# Patient Record
Sex: MALE | Race: OTHER | NOT HISPANIC OR LATINO | Employment: UNEMPLOYED | URBAN - METROPOLITAN AREA
[De-identification: names, ages, dates, MRNs, and addresses within clinical notes are randomized per-mention and may not be internally consistent; named-entity substitution may affect disease eponyms.]

---

## 2017-08-09 ENCOUNTER — HOSPITAL ENCOUNTER (EMERGENCY)
Facility: HOSPITAL | Age: 47
Discharge: HOME | End: 2017-08-09
Attending: EMERGENCY MEDICINE | Admitting: EMERGENCY MEDICINE

## 2017-08-09 ENCOUNTER — APPOINTMENT (EMERGENCY)
Dept: RADIOLOGY | Facility: HOSPITAL | Age: 47
End: 2017-08-09

## 2017-08-09 VITALS
DIASTOLIC BLOOD PRESSURE: 60 MMHG | WEIGHT: 240 LBS | HEART RATE: 97 BPM | TEMPERATURE: 98.3 F | RESPIRATION RATE: 18 BRPM | SYSTOLIC BLOOD PRESSURE: 125 MMHG | OXYGEN SATURATION: 95 %

## 2017-08-09 DIAGNOSIS — R09.89 CHEST CONGESTION: Primary | ICD-10-CM

## 2017-08-09 LAB
ANION GAP SERPL CALCULATED.3IONS-SCNC: 7 MMOL/L (ref 4–13)
ATRIAL RATE: 67 BPM
BASOPHILS # BLD AUTO: 0.04 THOUSANDS/ΜL (ref 0–0.1)
BASOPHILS NFR BLD AUTO: 0 % (ref 0–1)
BUN SERPL-MCNC: 18 MG/DL (ref 5–25)
CALCIUM SERPL-MCNC: 8.6 MG/DL (ref 8.3–10.1)
CHLORIDE SERPL-SCNC: 104 MMOL/L (ref 100–108)
CO2 SERPL-SCNC: 27 MMOL/L (ref 21–32)
CREAT SERPL-MCNC: 0.93 MG/DL (ref 0.6–1.3)
EOSINOPHIL # BLD AUTO: 0.38 THOUSAND/ΜL (ref 0–0.61)
EOSINOPHIL NFR BLD AUTO: 3 % (ref 0–6)
ERYTHROCYTE [DISTWIDTH] IN BLOOD BY AUTOMATED COUNT: 12.6 % (ref 11.6–15.1)
GFR SERPL CREATININE-BSD FRML MDRD: 98 ML/MIN/1.73SQ M
GLUCOSE SERPL-MCNC: 268 MG/DL (ref 65–140)
HCT VFR BLD AUTO: 38 % (ref 36.5–49.3)
HGB BLD-MCNC: 13 G/DL (ref 12–17)
LYMPHOCYTES # BLD AUTO: 3.47 THOUSANDS/ΜL (ref 0.6–4.47)
LYMPHOCYTES NFR BLD AUTO: 31 % (ref 14–44)
MCH RBC QN AUTO: 31.9 PG (ref 26.8–34.3)
MCHC RBC AUTO-ENTMCNC: 34.2 G/DL (ref 31.4–37.4)
MCV RBC AUTO: 93 FL (ref 82–98)
MONOCYTES # BLD AUTO: 0.88 THOUSAND/ΜL (ref 0.17–1.22)
MONOCYTES NFR BLD AUTO: 8 % (ref 4–12)
NEUTROPHILS # BLD AUTO: 6.43 THOUSANDS/ΜL (ref 1.85–7.62)
NEUTS SEG NFR BLD AUTO: 58 % (ref 43–75)
NRBC BLD AUTO-RTO: 0 /100 WBCS
P AXIS: 57 DEGREES
PLATELET # BLD AUTO: 239 THOUSANDS/UL (ref 149–390)
PMV BLD AUTO: 9.8 FL (ref 8.9–12.7)
POTASSIUM SERPL-SCNC: 3.9 MMOL/L (ref 3.5–5.3)
PR INTERVAL: 164 MS
QRS AXIS: 1 DEGREES
QRSD INTERVAL: 86 MS
QT INTERVAL: 392 MS
QTC INTERVAL: 414 MS
RBC # BLD AUTO: 4.07 MILLION/UL (ref 3.88–5.62)
SODIUM SERPL-SCNC: 138 MMOL/L (ref 136–145)
T WAVE AXIS: 53 DEGREES
TROPONIN I SERPL-MCNC: <0.02 NG/ML
VENTRICULAR RATE: 67 BPM
WBC # BLD AUTO: 11.23 THOUSAND/UL (ref 4.31–10.16)

## 2017-08-09 PROCEDURE — 84484 ASSAY OF TROPONIN QUANT: CPT | Performed by: STUDENT IN AN ORGANIZED HEALTH CARE EDUCATION/TRAINING PROGRAM

## 2017-08-09 PROCEDURE — 71020 HB CHEST X-RAY 2VW FRONTAL&LATL: CPT

## 2017-08-09 PROCEDURE — 85025 COMPLETE CBC W/AUTO DIFF WBC: CPT | Performed by: STUDENT IN AN ORGANIZED HEALTH CARE EDUCATION/TRAINING PROGRAM

## 2017-08-09 PROCEDURE — 99285 EMERGENCY DEPT VISIT HI MDM: CPT

## 2017-08-09 PROCEDURE — 94640 AIRWAY INHALATION TREATMENT: CPT

## 2017-08-09 PROCEDURE — 93005 ELECTROCARDIOGRAM TRACING: CPT | Performed by: EMERGENCY MEDICINE

## 2017-08-09 PROCEDURE — 36415 COLL VENOUS BLD VENIPUNCTURE: CPT | Performed by: STUDENT IN AN ORGANIZED HEALTH CARE EDUCATION/TRAINING PROGRAM

## 2017-08-09 PROCEDURE — 80048 BASIC METABOLIC PNL TOTAL CA: CPT | Performed by: STUDENT IN AN ORGANIZED HEALTH CARE EDUCATION/TRAINING PROGRAM

## 2017-08-09 RX ORDER — FLUTICASONE PROPIONATE 50 MCG
1 SPRAY, SUSPENSION (ML) NASAL DAILY
Qty: 16 G | Refills: 0 | Status: SHIPPED | OUTPATIENT
Start: 2017-08-09

## 2017-08-09 RX ORDER — ALBUTEROL SULFATE 90 UG/1
2 AEROSOL, METERED RESPIRATORY (INHALATION) EVERY 4 HOURS PRN
Qty: 1 INHALER | Refills: 0 | Status: SHIPPED | OUTPATIENT
Start: 2017-08-09

## 2017-08-09 RX ADMIN — ALBUTEROL SULFATE 5 MG: 2.5 SOLUTION RESPIRATORY (INHALATION) at 00:48

## 2024-11-14 ENCOUNTER — NEW PATIENT COMPREHENSIVE (OUTPATIENT)
Dept: URBAN - METROPOLITAN AREA CLINIC 6 | Facility: CLINIC | Age: 54
End: 2024-11-14

## 2024-11-14 DIAGNOSIS — H25.813: ICD-10-CM

## 2024-11-14 DIAGNOSIS — H04.123: ICD-10-CM

## 2024-11-14 PROCEDURE — 92004 COMPRE OPH EXAM NEW PT 1/>: CPT

## 2024-11-14 ASSESSMENT — VISUAL ACUITY
OD_SC: 20/60
OS_SC: 20/80

## 2024-11-14 ASSESSMENT — TONOMETRY
OS_IOP_MMHG: 13
OD_IOP_MMHG: 13

## 2024-12-18 ENCOUNTER — PRE-OP CATARACT MEASUREMENTS (OUTPATIENT)
Dept: URBAN - METROPOLITAN AREA CLINIC 6 | Facility: CLINIC | Age: 54
End: 2024-12-18

## 2024-12-18 DIAGNOSIS — H25.813: ICD-10-CM

## 2024-12-18 DIAGNOSIS — H04.123: ICD-10-CM

## 2024-12-18 PROCEDURE — 92012 INTRM OPH EXAM EST PATIENT: CPT

## 2024-12-18 PROCEDURE — 92136 OPHTHALMIC BIOMETRY: CPT

## 2024-12-18 ASSESSMENT — VISUAL ACUITY
OD_PH: 20/40
OS_SC: 20/100
OS_PH: 20/50
OD_SC: 20/100

## 2024-12-18 ASSESSMENT — KERATOMETRY
OD_K2POWER_DIOPTERS: 43.75
OS_K2POWER_DIOPTERS: 43.50
OD_AXISANGLE2_DEGREES: 100
OS_AXISANGLE2_DEGREES: 69
OS_K1POWER_DIOPTERS: 43.00
OS_AXISANGLE_DEGREES: 159
OD_K1POWER_DIOPTERS: 43.00
OD_AXISANGLE_DEGREES: 10

## 2024-12-18 ASSESSMENT — TONOMETRY
OD_IOP_MMHG: 19
OS_IOP_MMHG: 19

## 2024-12-23 ENCOUNTER — OFFICE VISIT (OUTPATIENT)
Dept: FAMILY MEDICINE CLINIC | Facility: CLINIC | Age: 54
End: 2024-12-23
Payer: COMMERCIAL

## 2024-12-23 VITALS
OXYGEN SATURATION: 99 % | DIASTOLIC BLOOD PRESSURE: 90 MMHG | HEIGHT: 67 IN | SYSTOLIC BLOOD PRESSURE: 122 MMHG | WEIGHT: 218.2 LBS | HEART RATE: 78 BPM | TEMPERATURE: 97.5 F | BODY MASS INDEX: 34.25 KG/M2

## 2024-12-23 DIAGNOSIS — Z13.6 SCREENING FOR CARDIOVASCULAR CONDITION: ICD-10-CM

## 2024-12-23 DIAGNOSIS — Z12.5 PROSTATE CANCER SCREENING: ICD-10-CM

## 2024-12-23 DIAGNOSIS — H25.013 CORTICAL AGE-RELATED CATARACT OF BOTH EYES: ICD-10-CM

## 2024-12-23 DIAGNOSIS — Z01.818 PRE-OP EXAMINATION: Primary | ICD-10-CM

## 2024-12-23 DIAGNOSIS — Z86.19 HISTORY OF LYME DISEASE: ICD-10-CM

## 2024-12-23 PROCEDURE — 99244 OFF/OP CNSLTJ NEW/EST MOD 40: CPT | Performed by: FAMILY MEDICINE

## 2024-12-24 NOTE — ASSESSMENT & PLAN NOTE
Orders:  •  Comprehensive metabolic panel; Future  •  TSH, 3rd generation with Free T4 reflex; Future  •  Hemoglobin A1C; Future  •  Lipid Panel with Direct LDL reflex; Future

## 2024-12-24 NOTE — PROGRESS NOTES
Pre-operative Clearance  Name: Shakir Osei      : 1970      MRN: [id number]  Encounter Provider: Jose Bailey DO  Encounter Date: 2024   Encounter department: [facility]    Assessment & Plan  Pre-op examination         History of Lyme disease    Orders:  •  Lyme Total AB W Reflex to IGM/IGG; Future    Prostate cancer screening    Orders:  •  PSA, Total Screen; Future    Screening for cardiovascular condition    Orders:  •  Comprehensive metabolic panel; Future  •  TSH, 3rd generation with Free T4 reflex; Future  •  Hemoglobin A1C; Future  •  Lipid Panel with Direct LDL reflex; Future    Cortical age-related cataract of both eyes           Pre-operative Clearance:     Revised Cardiac Risk Index:  RCI RISK CLASS I (0 risk factors, risk of major cardiac complications approximately 0.5%)    Clearance:  Patient is medically optimized (CLEARED) for proposed surgery without any additional cardiac testing.      Medication Instructions:   - Avoid herbs or non-directed vitamins one week prior to surgery    - Avoid aspirin containing medications or non-steroidal anti-inflammatory drugs one week preceding surgery    - May take tylenol for pain up until the night before surgery        Depression Screening and Follow-up Plan: Patient was screened for depression during today's encounter. They screened negative with a PHQ-2 score of 0.    Tobacco Cessation Counseling: Tobacco cessation counseling was provided. The patient is sincerely urged to quit consumption of tobacco. He is not ready to quit tobacco. Medication options and side effects of medication discussed. Patient refused medication.       History of Present Illness     Pre-op for bilateral cataracts.    New pt. Checking labs.    Cologuard for CRC screening      Review of Systems   Constitutional:  Negative for chills and fever.   HENT:  Negative for ear pain and sore throat.    Eyes:  Negative for pain and visual disturbance.  "  Respiratory:  Negative for cough and shortness of breath.    Cardiovascular:  Negative for chest pain and palpitations.   Gastrointestinal:  Negative for abdominal pain and vomiting.   Genitourinary:  Negative for dysuria and hematuria.   Musculoskeletal:  Negative for arthralgias and back pain.   Skin:  Negative for color change and rash.   Neurological:  Negative for seizures and syncope.   All other systems reviewed and are negative.    Past Medical History   History reviewed. No pertinent past medical history.  History reviewed. No pertinent surgical history.  History reviewed. No pertinent family history.  Social History     Tobacco Use   • Smoking status: Never     Passive exposure: Never   • Smokeless tobacco: Not on file   Vaping Use   • Vaping status: Never Used   Substance and Sexual Activity   • Alcohol use: Yes     Comment: socially   • Drug use: No   • Sexual activity: Yes     Partners: Female     Current Outpatient Medications on File Prior to Visit   Medication Sig   • albuterol (PROVENTIL HFA,VENTOLIN HFA) 90 mcg/act inhaler Inhale 2 puffs every 4 (four) hours as needed for wheezing (Patient not taking: Reported on 12/23/2024)   • fluticasone (FLONASE) 50 mcg/act nasal spray 1 spray into each nostril daily (Patient not taking: Reported on 12/23/2024)     No Known Allergies  Objective   /90 (BP Location: Left arm, Patient Position: Sitting, Cuff Size: Large)   Pulse 78   Temp 97.5 °F (36.4 °C) (Temporal)   Ht 5' 7\" (1.702 m)   Wt 99 kg (218 lb 3.2 oz)   SpO2 99%   BMI 34.17 kg/m²     Physical Exam  Vitals reviewed.   Constitutional:       General: He is not in acute distress.     Appearance: Normal appearance. He is well-developed.   HENT:      Head: Normocephalic and atraumatic.      Nose: No congestion or rhinorrhea.      Mouth/Throat:      Pharynx: No oropharyngeal exudate or posterior oropharyngeal erythema.   Eyes:      Conjunctiva/sclera: Conjunctivae normal.   Cardiovascular:      " Rate and Rhythm: Normal rate and regular rhythm.      Pulses: Normal pulses.      Heart sounds: Normal heart sounds. No murmur heard.  Pulmonary:      Effort: Pulmonary effort is normal. No respiratory distress.      Breath sounds: Normal breath sounds.   Abdominal:      Palpations: Abdomen is soft.      Tenderness: There is no abdominal tenderness.   Musculoskeletal:         General: No swelling.      Cervical back: Neck supple.   Skin:     General: Skin is warm and dry.      Capillary Refill: Capillary refill takes less than 2 seconds.   Neurological:      Mental Status: He is alert.   Psychiatric:         Mood and Affect: Mood normal.       Administrative Statements   I have spent a total time of 49 minutes in caring for this patient on the day of the visit/encounter including Diagnostic results, Prognosis, Risks and benefits of tx options, Instructions for management, Impressions, Counseling / Coordination of care, Documenting in the medical record, Reviewing / ordering tests, medicine, procedures  , Obtaining or reviewing history  , and Communicating with other healthcare professionals .     Jose Bailey, DO

## 2025-01-08 ENCOUNTER — 1 DAY POST-OP (OUTPATIENT)
Dept: URBAN - METROPOLITAN AREA CLINIC 6 | Facility: CLINIC | Age: 55
End: 2025-01-08

## 2025-01-08 DIAGNOSIS — H25.811: ICD-10-CM

## 2025-01-08 DIAGNOSIS — Z96.1: ICD-10-CM

## 2025-01-08 PROCEDURE — 99024 POSTOP FOLLOW-UP VISIT: CPT

## 2025-01-08 PROCEDURE — 92136 OPHTHALMIC BIOMETRY: CPT | Mod: 26,RT

## 2025-01-08 ASSESSMENT — TONOMETRY
OD_IOP_MMHG: 18
OS_IOP_MMHG: 21

## 2025-01-08 ASSESSMENT — KERATOMETRY
OS_AXISANGLE_DEGREES: 159
OS_K1POWER_DIOPTERS: 43.00
OD_K2POWER_DIOPTERS: 43.75
OD_K1POWER_DIOPTERS: 43.00
OD_AXISANGLE_DEGREES: 10
OS_K2POWER_DIOPTERS: 43.50
OS_AXISANGLE2_DEGREES: 69
OD_AXISANGLE2_DEGREES: 100

## 2025-01-08 ASSESSMENT — VISUAL ACUITY
OD_SC: 20/70
OU_SC: J3
OS_SC: 20/30-2

## 2025-01-15 ENCOUNTER — 1 WEEK POST-OP (OUTPATIENT)
Dept: URBAN - METROPOLITAN AREA CLINIC 6 | Facility: CLINIC | Age: 55
End: 2025-01-15

## 2025-01-15 DIAGNOSIS — Z96.1: ICD-10-CM

## 2025-01-15 DIAGNOSIS — H25.811: ICD-10-CM

## 2025-01-15 PROCEDURE — 99024 POSTOP FOLLOW-UP VISIT: CPT

## 2025-01-15 ASSESSMENT — KERATOMETRY
OS_K1POWER_DIOPTERS: 43.00
OD_K1POWER_DIOPTERS: 43.00
OS_AXISANGLE_DEGREES: 159
OS_K2POWER_DIOPTERS: 43.50
OD_AXISANGLE_DEGREES: 10
OD_AXISANGLE2_DEGREES: 100
OS_AXISANGLE2_DEGREES: 69
OD_K2POWER_DIOPTERS: 43.75

## 2025-01-15 ASSESSMENT — VISUAL ACUITY
OS_SC: 20/25
OD_SC: 20/70

## 2025-01-15 ASSESSMENT — TONOMETRY
OS_IOP_MMHG: 14
OD_IOP_MMHG: 15

## 2025-01-23 ENCOUNTER — SURGERY/PROCEDURE (OUTPATIENT)
Dept: URBAN - METROPOLITAN AREA SURGICAL CENTER 6 | Facility: SURGICAL CENTER | Age: 55
End: 2025-01-23

## 2025-01-23 DIAGNOSIS — H25.811: ICD-10-CM

## 2025-01-23 PROCEDURE — 66984 XCAPSL CTRC RMVL W/O ECP: CPT | Mod: 79,RT

## 2025-01-24 ENCOUNTER — 1 DAY POST-OP (OUTPATIENT)
Dept: URBAN - METROPOLITAN AREA CLINIC 6 | Facility: CLINIC | Age: 55
End: 2025-01-24

## 2025-01-24 DIAGNOSIS — Z96.1: ICD-10-CM

## 2025-01-24 PROCEDURE — 99024 POSTOP FOLLOW-UP VISIT: CPT

## 2025-01-24 ASSESSMENT — VISUAL ACUITY
OD_PH: 20/25-1
OS_SC: 20/30+1
OS_PH: 20/25-1
OD_SC: 20/30+2

## 2025-01-24 ASSESSMENT — KERATOMETRY
OD_AXISANGLE2_DEGREES: 100
OS_K2POWER_DIOPTERS: 43.50
OS_AXISANGLE_DEGREES: 159
OS_AXISANGLE2_DEGREES: 69
OD_K1POWER_DIOPTERS: 43.00
OS_K1POWER_DIOPTERS: 43.00
OD_K2POWER_DIOPTERS: 43.75
OD_AXISANGLE_DEGREES: 10

## 2025-01-24 ASSESSMENT — TONOMETRY
OS_IOP_MMHG: 18
OD_IOP_MMHG: 19

## 2025-01-31 ENCOUNTER — 1 WEEK POST-OP (OUTPATIENT)
Dept: URBAN - METROPOLITAN AREA CLINIC 6 | Facility: CLINIC | Age: 55
End: 2025-01-31

## 2025-01-31 DIAGNOSIS — Z96.1: ICD-10-CM

## 2025-01-31 PROCEDURE — 99024 POSTOP FOLLOW-UP VISIT: CPT

## 2025-01-31 ASSESSMENT — KERATOMETRY
OD_K1POWER_DIOPTERS: 43.00
OD_AXISANGLE2_DEGREES: 100
OS_AXISANGLE_DEGREES: 159
OS_K2POWER_DIOPTERS: 43.50
OS_K1POWER_DIOPTERS: 43.00
OD_AXISANGLE_DEGREES: 10
OS_AXISANGLE2_DEGREES: 69
OD_K2POWER_DIOPTERS: 43.75

## 2025-01-31 ASSESSMENT — VISUAL ACUITY
OD_SC: 20/20
OS_SC: 20/30-2

## 2025-01-31 ASSESSMENT — TONOMETRY
OD_IOP_MMHG: 19
OS_IOP_MMHG: 12